# Patient Record
Sex: FEMALE | URBAN - METROPOLITAN AREA
[De-identification: names, ages, dates, MRNs, and addresses within clinical notes are randomized per-mention and may not be internally consistent; named-entity substitution may affect disease eponyms.]

---

## 2017-07-20 ENCOUNTER — NURSE TRIAGE (OUTPATIENT)
Dept: NURSING | Facility: CLINIC | Age: 67
End: 2017-07-20

## 2017-07-20 NOTE — TELEPHONE ENCOUNTER
Clinic Action Needed:No  Reason for Call: Sister in law calling, very poor quality to phone call, trouble hearing caller.  [place] is visiting from [place], sister in  was reporting that Palmira has been having chest pain wondering if they could be seen in clinic or should they go to an ER.  No triage was done, advised ER and if she was having active chest pain, difficulty breathing, call 911.  Caller reports they are enroute to ER at [facility] now.    Routed to: Not routed.    Letitia Holcomb RN  [facility]